# Patient Record
Sex: MALE | Race: WHITE | NOT HISPANIC OR LATINO | Employment: STUDENT | URBAN - METROPOLITAN AREA
[De-identification: names, ages, dates, MRNs, and addresses within clinical notes are randomized per-mention and may not be internally consistent; named-entity substitution may affect disease eponyms.]

---

## 2023-09-27 LAB
ALANINE AMINOTRANSFERASE (SGPT) (U/L) IN SER/PLAS: 19 U/L (ref 10–52)
ALBUMIN (G/DL) IN SER/PLAS: 4.5 G/DL (ref 3.4–5)
ALKALINE PHOSPHATASE (U/L) IN SER/PLAS: 74 U/L (ref 33–120)
ANION GAP IN SER/PLAS: 35 MMOL/L (ref 10–20)
APPEARANCE, URINE: CLEAR
ASPARTATE AMINOTRANSFERASE (SGOT) (U/L) IN SER/PLAS: 17 U/L (ref 9–39)
BILIRUBIN TOTAL (MG/DL) IN SER/PLAS: 1.9 MG/DL (ref 0–1.2)
BILIRUBIN, URINE: NEGATIVE
BLOOD, URINE: NEGATIVE
CALCIUM (MG/DL) IN SER/PLAS: 9.5 MG/DL (ref 8.6–10.6)
CARBON DIOXIDE, TOTAL (MMOL/L) IN SER/PLAS: 6 MMOL/L (ref 21–32)
CHLORIDE (MMOL/L) IN SER/PLAS: 101 MMOL/L (ref 98–107)
COLOR, URINE: ABNORMAL
CREATININE (MG/DL) IN SER/PLAS: 1.01 MG/DL (ref 0.5–1.3)
ERYTHROCYTE DISTRIBUTION WIDTH (RATIO) BY AUTOMATED COUNT: 12.2 % (ref 11.5–14.5)
ERYTHROCYTE MEAN CORPUSCULAR HEMOGLOBIN CONCENTRATION (G/DL) BY AUTOMATED: 34.1 G/DL (ref 32–36)
ERYTHROCYTE MEAN CORPUSCULAR VOLUME (FL) BY AUTOMATED COUNT: 87 FL (ref 80–100)
ERYTHROCYTES (10*6/UL) IN BLOOD BY AUTOMATED COUNT: 5.11 X10E12/L (ref 4.5–5.9)
GFR MALE: >90 ML/MIN/1.73M2
GLUCOSE (MG/DL) IN SER/PLAS: 419 MG/DL (ref 74–99)
GLUCOSE, URINE: ABNORMAL MG/DL
HEMATOCRIT (%) IN BLOOD BY AUTOMATED COUNT: 44.6 % (ref 41–52)
HEMOGLOBIN (G/DL) IN BLOOD: 15.2 G/DL (ref 13.5–17.5)
KETONES, URINE: ABNORMAL MG/DL
LEUKOCYTE ESTERASE, URINE: NEGATIVE
LEUKOCYTES (10*3/UL) IN BLOOD BY AUTOMATED COUNT: 20.5 X10E9/L (ref 4.4–11.3)
LIPASE (U/L) IN SER/PLAS: 7 U/L (ref 9–82)
NITRITE, URINE: NEGATIVE
NRBC (PER 100 WBCS) BY AUTOMATED COUNT: 0 /100 WBC (ref 0–0)
PATIENT TEMPERATURE: 37 DEGREES C
PH, URINE: 5 (ref 5–8)
PLATELETS (10*3/UL) IN BLOOD AUTOMATED COUNT: 351 X10E9/L (ref 150–450)
POCT ANION GAP, VENOUS: 28 MMOL/L (ref 10–25)
POCT ANION GAP, VENOUS: 30 MMOL/L (ref 10–25)
POCT ANION GAP, VENOUS: ABNORMAL MMOL/L (ref 10–25)
POCT BASE EXCESS, VENOUS: -18.2 MMOL/L (ref -2–3)
POCT BASE EXCESS, VENOUS: -19.9 MMOL/L (ref -2–3)
POCT BASE EXCESS, VENOUS: ABNORMAL MMOL/L (ref -2–3)
POCT CHLORIDE, VENOUS: 100 MMOL/L (ref 98–107)
POCT CHLORIDE, VENOUS: 102 MMOL/L (ref 98–107)
POCT CHLORIDE, VENOUS: ABNORMAL MMOL/L (ref 98–107)
POCT GLUCOSE, VENOUS: 440 MG/DL (ref 74–99)
POCT GLUCOSE, VENOUS: 477 MG/DL (ref 74–99)
POCT GLUCOSE, VENOUS: 532 MG/DL (ref 74–99)
POCT GLUCOSE: 365 MG/DL (ref 74–99)
POCT GLUCOSE: 385 MG/DL (ref 74–99)
POCT HCO3, VENOUS: 7.8 MMOL/L (ref 22–26)
POCT HCO3, VENOUS: 8.2 MMOL/L (ref 22–26)
POCT HCO3, VENOUS: ABNORMAL MMOL/L (ref 22–26)
POCT HEMATOCRIT, VENOUS: 45 % (ref 41–52)
POCT HEMATOCRIT, VENOUS: 46 % (ref 41–52)
POCT HEMATOCRIT, VENOUS: 47 % (ref 41–52)
POCT HEMOGLOBIN, VENOUS: 15 G/DL (ref 13.5–17.5)
POCT HEMOGLOBIN, VENOUS: 15.2 G/DL (ref 13.5–17.5)
POCT HEMOGLOBIN, VENOUS: 15.5 G/DL (ref 13.5–17.5)
POCT IONIZED CALCIUM, VENOUS: 1.16 MMOL/L (ref 1.1–1.33)
POCT IONIZED CALCIUM, VENOUS: 1.21 MMOL/L (ref 1.1–1.33)
POCT IONIZED CALCIUM, VENOUS: ABNORMAL MMOL/L (ref 1.1–1.33)
POCT LACTATE, VENOUS: 3.8 MMOL/L (ref 0.4–2)
POCT LACTATE, VENOUS: 4.9 MMOL/L (ref 0.4–2)
POCT LACTATE, VENOUS: 5.3 MMOL/L (ref 0.4–2)
POCT OXY HEMOGLOBIN, VENOUS: 68.7 % (ref 45–75)
POCT OXY HEMOGLOBIN, VENOUS: 85.3 % (ref 45–75)
POCT OXY HEMOGLOBIN, VENOUS: 91 % (ref 45–75)
POCT PCO2, VENOUS: 22 MMHG (ref 41–51)
POCT PCO2, VENOUS: 24 MMHG (ref 41–51)
POCT PCO2, VENOUS: ABNORMAL MMHG (ref 41–51)
POCT PH, VENOUS: 7.12 (ref 7.33–7.43)
POCT PH, VENOUS: 7.18 (ref 7.33–7.43)
POCT PH, VENOUS: ABNORMAL (ref 7.33–7.43)
POCT PO2, VENOUS: 50 MMHG (ref 35–45)
POCT PO2, VENOUS: 57 MMHG (ref 35–45)
POCT PO2, VENOUS: 76 MMHG (ref 35–45)
POCT POTASSIUM, VENOUS: 6 MMOL/L (ref 3.5–5.3)
POCT POTASSIUM, VENOUS: 6.4 MMOL/L (ref 3.5–5.3)
POCT POTASSIUM, VENOUS: 6.7 MMOL/L (ref 3.5–5.3)
POCT SO2, VENOUS: 71 % (ref 45–75)
POCT SO2, VENOUS: 88 % (ref 45–75)
POCT SO2, VENOUS: 94 % (ref 45–75)
POCT SODIUM, VENOUS: 131 MMOL/L (ref 136–145)
POCT SODIUM, VENOUS: 132 MMOL/L (ref 136–145)
POCT SODIUM, VENOUS: ABNORMAL MMOL/L (ref 136–145)
POTASSIUM (MMOL/L) IN SER/PLAS: 6.1 MMOL/L (ref 3.5–5.3)
PROTEIN TOTAL: 6.7 G/DL (ref 6.4–8.2)
PROTEIN, URINE: NEGATIVE MG/DL
SODIUM (MMOL/L) IN SER/PLAS: 136 MMOL/L (ref 136–145)
SPECIFIC GRAVITY, URINE: 1.02 (ref 1–1.03)
UREA NITROGEN (MG/DL) IN SER/PLAS: 18 MG/DL (ref 6–23)
UROBILINOGEN, URINE: <2 MG/DL (ref 0–1.9)

## 2023-09-28 ENCOUNTER — HOSPITAL ENCOUNTER (INPATIENT)
Dept: DATA CONVERSION | Facility: HOSPITAL | Age: 23
LOS: 1 days | Discharge: HOME | End: 2023-09-28
Attending: EMERGENCY MEDICINE | Admitting: EMERGENCY MEDICINE
Payer: COMMERCIAL

## 2023-09-28 VITALS — BODY MASS INDEX: 26.68 KG/M2 | HEIGHT: 73 IN | WEIGHT: 201.28 LBS

## 2023-09-28 DIAGNOSIS — E11.10 TYPE 2 DIABETES MELLITUS WITH KETOACIDOSIS WITHOUT COMA (MULTI): ICD-10-CM

## 2023-09-28 LAB
ALBUMIN (G/DL) IN SER/PLAS: 3.5 G/DL (ref 3.4–5)
ALBUMIN (G/DL) IN SER/PLAS: 4 G/DL (ref 3.4–5)
AMPHETAMINE (PRESENCE) IN URINE BY SCREEN METHOD: ABNORMAL
ANION GAP IN SER/PLAS: 12 MMOL/L (ref 10–20)
ANION GAP IN SER/PLAS: 22 MMOL/L (ref 10–20)
BARBITURATES PRESENCE IN URINE BY SCREEN METHOD: ABNORMAL
BASOPHILS (10*3/UL) IN BLOOD BY AUTOMATED COUNT: 0.05 X10E9/L (ref 0–0.1)
BASOPHILS/100 LEUKOCYTES IN BLOOD BY AUTOMATED COUNT: 0.2 % (ref 0–2)
BENZODIAZEPINE (PRESENCE) IN URINE BY SCREEN METHOD: ABNORMAL
BETA HYDROXYBUTYRATE (MMOL/L) IN SER/PLAS: 6.91 MMOL/L (ref 0.02–0.27)
CALCIUM (MG/DL) IN SER/PLAS: 8.7 MG/DL (ref 8.6–10.6)
CALCIUM (MG/DL) IN SER/PLAS: 8.8 MG/DL (ref 8.6–10.6)
CANNABINOIDS IN URINE BY SCREEN METHOD: ABNORMAL
CARBON DIOXIDE, TOTAL (MMOL/L) IN SER/PLAS: 14 MMOL/L (ref 21–32)
CARBON DIOXIDE, TOTAL (MMOL/L) IN SER/PLAS: 20 MMOL/L (ref 21–32)
CHLORIDE (MMOL/L) IN SER/PLAS: 107 MMOL/L (ref 98–107)
CHLORIDE (MMOL/L) IN SER/PLAS: 110 MMOL/L (ref 98–107)
COCAINE (PRESENCE) IN URINE BY SCREEN METHOD: ABNORMAL
CREATININE (MG/DL) IN SER/PLAS: 0.95 MG/DL (ref 0.5–1.3)
CREATININE (MG/DL) IN SER/PLAS: 1.06 MG/DL (ref 0.5–1.3)
DRUG SCREEN COMMENT URINE: ABNORMAL
EOSINOPHILS (10*3/UL) IN BLOOD BY AUTOMATED COUNT: 0.01 X10E9/L (ref 0–0.7)
EOSINOPHILS/100 LEUKOCYTES IN BLOOD BY AUTOMATED COUNT: 0 % (ref 0–6)
ERYTHROCYTE DISTRIBUTION WIDTH (RATIO) BY AUTOMATED COUNT: 12.2 % (ref 11.5–14.5)
ERYTHROCYTE MEAN CORPUSCULAR HEMOGLOBIN CONCENTRATION (G/DL) BY AUTOMATED: 34.3 G/DL (ref 32–36)
ERYTHROCYTE MEAN CORPUSCULAR VOLUME (FL) BY AUTOMATED COUNT: 90 FL (ref 80–100)
ERYTHROCYTES (10*6/UL) IN BLOOD BY AUTOMATED COUNT: 4.06 X10E12/L (ref 4.5–5.9)
ESTIMATED AVERAGE GLUCOSE FOR HBA1C: 200 MG/DL
ESTIMATED AVERAGE GLUCOSE FOR HBA1C: NORMAL
FENTANYL URINE: ABNORMAL
GFR MALE: >90 ML/MIN/1.73M2
GFR MALE: >90 ML/MIN/1.73M2
GLUCOSE (MG/DL) IN SER/PLAS: 161 MG/DL (ref 74–99)
GLUCOSE (MG/DL) IN SER/PLAS: 211 MG/DL (ref 74–99)
HEMATOCRIT (%) IN BLOOD BY AUTOMATED COUNT: 36.7 % (ref 41–52)
HEMOGLOBIN (G/DL) IN BLOOD: 12.6 G/DL (ref 13.5–17.5)
HEMOGLOBIN A1C/HEMOGLOBIN TOTAL IN BLOOD: 8.6 %
HEMOGLOBIN A1C/HEMOGLOBIN TOTAL IN BLOOD: NORMAL
HGB A1C-DATA CONVERSION: NORMAL %
IMMATURE GRANULOCYTES/100 LEUKOCYTES IN BLOOD BY AUTOMATED COUNT: 0.7 % (ref 0–0.9)
LEUKOCYTES (10*3/UL) IN BLOOD BY AUTOMATED COUNT: 20.5 X10E9/L (ref 4.4–11.3)
LYMPHOCYTES (10*3/UL) IN BLOOD BY AUTOMATED COUNT: 2.95 X10E9/L (ref 1.2–4.8)
LYMPHOCYTES/100 LEUKOCYTES IN BLOOD BY AUTOMATED COUNT: 14.4 % (ref 13–44)
MAGNESIUM (MG/DL) IN SER/PLAS: 1.53 MG/DL (ref 1.6–2.4)
MAGNESIUM (MG/DL) IN SER/PLAS: 1.66 MG/DL (ref 1.6–2.4)
MAGNESIUM (MG/DL) IN SER/PLAS: 1.66 MG/DL (ref 1.6–2.4)
MAGNESIUM (MG/DL) IN SER/PLAS: 1.77 MG/DL (ref 1.6–2.4)
MONOCYTES (10*3/UL) IN BLOOD BY AUTOMATED COUNT: 1.34 X10E9/L (ref 0.1–1)
MONOCYTES/100 LEUKOCYTES IN BLOOD BY AUTOMATED COUNT: 6.6 % (ref 2–10)
NEUTROPHILS (10*3/UL) IN BLOOD BY AUTOMATED COUNT: 15.95 X10E9/L (ref 1.2–7.7)
NEUTROPHILS/100 LEUKOCYTES IN BLOOD BY AUTOMATED COUNT: 78.1 % (ref 40–80)
NRBC (PER 100 WBCS) BY AUTOMATED COUNT: 0 /100 WBC (ref 0–0)
OPIATES (PRESENCE) IN URINE BY SCREEN METHOD: ABNORMAL
OXYCODONE (PRESENCE) IN URINE BY SCREEN METHOD: ABNORMAL
PATIENT TEMPERATURE: 37 DEGREES C
PATIENT TEMPERATURE: 37 DEGREES C
PHENCYCLIDINE (PRESENCE) IN URINE BY SCREEN METHOD: ABNORMAL
PHOSPHATE (MG/DL) IN SER/PLAS: 3.5 MG/DL (ref 2.5–4.9)
PHOSPHATE (MG/DL) IN SER/PLAS: 3.5 MG/DL (ref 2.5–4.9)
PHOSPHATE (MG/DL) IN SER/PLAS: 3.8 MG/DL (ref 2.5–4.9)
PHOSPHATE (MG/DL) IN SER/PLAS: 4.4 MG/DL (ref 2.5–4.9)
PLATELETS (10*3/UL) IN BLOOD AUTOMATED COUNT: 284 X10E9/L (ref 150–450)
POCT ANION GAP, VENOUS: 20 MMOL/L (ref 10–25)
POCT ANION GAP, VENOUS: 26 MMOL/L (ref 10–25)
POCT BASE EXCESS, VENOUS: -13.7 MMOL/L (ref -2–3)
POCT BASE EXCESS, VENOUS: -18.3 MMOL/L (ref -2–3)
POCT CHLORIDE, VENOUS: 104 MMOL/L (ref 98–107)
POCT CHLORIDE, VENOUS: 105 MMOL/L (ref 98–107)
POCT GLUCOSE, VENOUS: 280 MG/DL (ref 74–99)
POCT GLUCOSE, VENOUS: 405 MG/DL (ref 74–99)
POCT GLUCOSE: 132 MG/DL (ref 74–99)
POCT GLUCOSE: 162 MG/DL (ref 74–99)
POCT GLUCOSE: 162 MG/DL (ref 74–99)
POCT GLUCOSE: 167 MG/DL (ref 74–99)
POCT GLUCOSE: 188 MG/DL (ref 74–99)
POCT GLUCOSE: 227 MG/DL (ref 74–99)
POCT GLUCOSE: 238 MG/DL (ref 74–99)
POCT GLUCOSE: 242 MG/DL (ref 74–99)
POCT GLUCOSE: 264 MG/DL (ref 74–99)
POCT GLUCOSE: 99 MG/DL (ref 74–99)
POCT HCO3, VENOUS: 13.3 MMOL/L (ref 22–26)
POCT HCO3, VENOUS: 9.8 MMOL/L (ref 22–26)
POCT HEMATOCRIT, VENOUS: 42 % (ref 41–52)
POCT HEMATOCRIT, VENOUS: 44 % (ref 41–52)
POCT HEMOGLOBIN, VENOUS: 13.9 G/DL (ref 13.5–17.5)
POCT HEMOGLOBIN, VENOUS: 14.8 G/DL (ref 13.5–17.5)
POCT IONIZED CALCIUM, VENOUS: 1.24 MMOL/L (ref 1.1–1.33)
POCT IONIZED CALCIUM, VENOUS: 1.25 MMOL/L (ref 1.1–1.33)
POCT LACTATE, VENOUS: 1.5 MMOL/L (ref 0.4–2)
POCT LACTATE, VENOUS: 2.9 MMOL/L (ref 0.4–2)
POCT LACTATE, VENOUS: NORMAL
POCT OXY HEMOGLOBIN, VENOUS: 80.4 % (ref 45–75)
POCT OXY HEMOGLOBIN, VENOUS: 88.4 % (ref 45–75)
POCT PCO2, VENOUS: 30 MMHG (ref 41–51)
POCT PCO2, VENOUS: 34 MMHG (ref 41–51)
POCT PH, VENOUS: 7.12 (ref 7.33–7.43)
POCT PH, VENOUS: 7.2 (ref 7.33–7.43)
POCT PO2, VENOUS: 58 MMHG (ref 35–45)
POCT PO2, VENOUS: 65 MMHG (ref 35–45)
POCT POTASSIUM, VENOUS: 4.9 MMOL/L (ref 3.5–5.3)
POCT POTASSIUM, VENOUS: 5.8 MMOL/L (ref 3.5–5.3)
POCT SO2, VENOUS: 83 % (ref 45–75)
POCT SO2, VENOUS: 91 % (ref 45–75)
POCT SODIUM, VENOUS: 133 MMOL/L (ref 136–145)
POCT SODIUM, VENOUS: 134 MMOL/L (ref 136–145)
POTASSIUM (MMOL/L) IN SER/PLAS: 4.4 MMOL/L (ref 3.5–5.3)
POTASSIUM (MMOL/L) IN SER/PLAS: 4.6 MMOL/L (ref 3.5–5.3)
SARS-COV-2 RESULT: NOT DETECTED
SODIUM (MMOL/L) IN SER/PLAS: 138 MMOL/L (ref 136–145)
SODIUM (MMOL/L) IN SER/PLAS: 138 MMOL/L (ref 136–145)
UREA NITROGEN (MG/DL) IN SER/PLAS: 16 MG/DL (ref 6–23)
UREA NITROGEN (MG/DL) IN SER/PLAS: 19 MG/DL (ref 6–23)

## 2023-09-30 NOTE — H&P
Service:   Critical Care Service:  ·  Service MICU      History of Present Illness:   HPI:    Mr. Maharaj is a 24yo PMH T1D with Dexcom and insulin pump,who presented to the ED for nausea, vomiting, abdominal  pain since this AM. States that he has had DKA two other times in the past, when he was first diagnosed with T1D and in  i/s/o a night of heavy drinking. He states that last night he had 8 alcoholic drinks and thinks this is what caused his DKA.  Normally has HA after drinking, but not typically GI sx. States that abdominal pain is epigastric and better with leaning forward. Stopped insulin pump this AM when he developed N/V. Thinks that his insulin pump might be malfunctioning since his CBGs  on that were lower than the readings the EMS got when they picked him up    At bedside, pt states that his nausea and abdominal pain are resolved. Feels ready to eat.     States that he recently was travelling [place] and has had a cough for 1.5weeks with yellow phlegm but that it is improving. Denes CP, SOB, dysuria, diarrhea, medication changes.     ED Course:   Vitals  T 36.5, , /69, RR 20, SpO2 98% on RA   Labs:  CBC: 20.5 <15.2 /44.6  <351  CMP: 136/6.1/101/6 /18/1.01<419  Anion gap: 29  BHB: 6.91  LFT: Alk phos 74, AST 17, ALT 19, Tbili 1.9  Lipase 7   UA: 2+ ketones and 3+ glucose      VB pH 7.18, pCO2 22, HCO3 8.2, lactate 3.8  2317 pH 7.12, pCO2 24, HCO3 7.8, lactate 4.9  0025 pH 7.12, pCO2 30, HCO3 9.8, lactate 2.9     POC glucose 365 > 385     Interventions: toradol 15mg IVP, morphine 4mg IVP,  Zofran 4mg IVP x2, 1L NS x2, insulin gtt 9U/hr    PMH:   As mentioned above    PSH:  None     Social Hx:  - EtOH: Yes (1-2 beers/week), 4-5 drinks on special occasions   - Tobacco: No   - Illicit Drugs: marijuana 2x/week (smokes and edibles)  - Lives [place]: his room[place]; 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  Lunch - 1 units per 6 grams of carbohydrates at Lunch.  Dinner - 1 units per 6 grams of carbohydrates at Dinner.  Bedtime Snack - 1 units per 6 grams of carbohydrates at Bedtime Snack.  Insulin Sensitivity Factor:  Time: 6 AM, 1 Unit Per: 30 mg/dl, Target: 120 mg/dl.  Time: 9 PM, 1 Unit Per: 30 mg/dl, Target: 150 mg/d    Allergies:  None       Comorbidities:   Comorbidites:  ·  Comorbid Conditions diabetes   ·  Diabetes Type Type 1   ·  Insulin Dependent yes   ·  DM Acuity or Status unknown     Social History:   Social History:  Smoking Status never smoker (1)   Alcohol Use occasionally(1)   Drug Use weekly (1)   Drug 2 Use denies (1)              Allergies:  ·  No Known Allergies :     Medications Prior to Admission:   Admission Medication Reconciliation has not been completed for this patient.    Objective:   Objective Information:    ·  Objective Information      T   P  R  BP   MAP  SpO2   Value  36.6  116  10  118/73   84  97%  Date/Time 9/28 4:00 9/28 3:00 9/28 3:00 9/28 3:00  9/28 3:00 9/28 3:00  Range  (36.5C - 37.2C )  (108 - 116 )  (10 - 20 )  (104 - 127 )/ (48 - 73 )  (69 - 84 )  (95% - 98% )  Highest temp of 37.2 C was recorded at 9/28 2:26      Pain reported at 9/28 2:26: 0 = None        Date:            Weight/Scale Type:  28-Sep-2023 02:35  91.3  kg / bed  27-Sep-2023 20:27  90.9  kg           Physical Exam Narrative:  ·  Physical Exam:    General: well-developed M lying in bed in NAD  CV: tachycardic, no m/r/g  Pulm: CTAB, no increased WOB  GI: Mild ttp in the upper abdomen, no guarding or rigidity  Extremities: no edema     Recent Lab Results:    Results:    CBC: 9/27/2023 20:44              \     Hgb     /                              \     15.2       /  WBC   ----------------  Plt               20.5 HH    ----------------    351              /     Hct     \                              /     44.6       \            RBC: 5.11     MCV: 87           CMP: 9/27/2023 20:44  NA+        Cl-     BUN  /                         136    101    18  /  --------------------------------  Glucose                ---------------------------  419 H    K+     HCO3-   Creat \                         6.1 HH   6 LL    1.01  \           \  T Bili  /                    \  1.9 H /  AST  x ---- x ALT        17 x ---- x 19         /  Alk P   \               /  74  \  Calcium : 9.5     Anion Gap : 35 H    Albumin : 4.5     T Protein : 6.7           RFP: 9/28/2023 03:00  NA+        Cl-     BUN  /                         138    107    19  /  --------------------------------  Glucose                ---------------------------  211 H    K+     HCO3-   Creat \                         4.6    14 L   1.06  \  Calcium : 8.8Anion Gap : 22 H         Albumin : 4.0     Phos : 3.8          ---------- Recent Arterial Blood Gas Results----------     nullnullnullnullnullnullnull    Assessment and Plan:   Neurology:  Diagnosis:    Mr. Maharaj is a 22yo PMH T1D with Dexcom and insulin pump, DKA 12/21 i/s/o heavy drinking who presented to the ED for nausea,  vomiting, abdominal pain admitted to MICU for DKA. Likely due to heavy drinking last night (reports that he had 8 drinks) vs pancreatitis given epigastric pain improved with leaning forward. Lipase  WNL, will order CT to r/o pancreatitis. Continue insulin gtt. Pt reports improvement in nausea and abdominal pain    Neuro  No active issues    Cardio  No active issues     Pulm  No active issues      GI  #Abdominal pain, likely 2/2 DKA vs pancreatitis ( patient reports that pain and vomiting started first and it was after a couple of those episodes that he turned off his insulin pump  :: lipase WNL  - CT A/P with contrast ordered     Renal   No active  issues    Endo  #Diabetic Ketoacidosis   #Hyperkalemia   :: initial labs: B, anion gap 29; BHB 6.9; UA 2+ ketones 3+ glucose, K 6.1   -  Started on DKA protocol - c/w insulin drip (decreased to 4U/hr for BG < 200)  -  Lantus 20U  -  s/p 2L NS in ED  -  will give additional 1L LR, D5 1/2NS @ 250cc/h   -  Q4 RFP until anion gap closes   -  HgbA1C, Utox     MSK  No active issues     Heme/Onc           -      no active issues   ID  #Leukocytosis i/s/o DKA          -     COVID ordered    Oxygen: None   Feeding/fluids: 1L LR + D5 1/2NS at 250cc/h  Sedation: None   Thromboprophylaxis: None   Ulcer prophylaxis: None   Glycemic control: insulin drip   Bowel care: None   Indwelling catheter: None   Lines: PIVs     Code Status: Full   NOK: Ling Santillan [phone]       Code Status:  ·  Code Status Full Code     Attestation:   Note Completion:  I am a:  Resident/Fellow   Attending Attestation I saw and evaluated the patient.  I personally obtained the key and critical portions of the history and physical exam or was physically present for key and  critical portions performed by the resident/fellow. I reviewed the resident/fellow?s documentation and discussed the patient with the resident/fellow.  I agree with the resident/fellow?s medical decision making as documented in the note.     I personally evaluated the patient on 28-Sep-2023   Comments/ Additional Findings    DKA needing continuous insulin gtt. I have altered the note to reflect my findings.  Remaining as detailed above     Critical Care Patient I have reviewed and evaluated the most recent data and results, personally examined the patient, and formulated the plan of care as presented above.  This patient  was critically ill and required continued critical care treatment. Teaching and any separately billable procedures are not included in the time calculation.    Billing Provider Critical Care Time 37 minute(s)   Primary Critical Care Issue/Treatment (See Assessment  "and Plan for greater detail) -- For the nature of the critical condition and treatment, this documentation has been prepared by the attending physician/ARMIN- billing provider of these critical  care services.         Electronic Signatures:  Marissa Thayer)  (Signed 28-Sep-2023 06:02)   Authored: Service, History of Present Illness, Objective,  Assessment and Plan, Note Completion   Co-Signer: History of Present Illness, Comorbidities, Social History, Allergies, Medications Prior to Admission, Objective, Assessment  and Plan, Note Completion  Linn Hernandez (Resident))  (Signed 28-Sep-2023 05:30)   Authored: History of Present Illness, Comorbidities,  Social History, Allergies, Medications Prior to Admission, Objective, Assessment and Plan, Note Completion      Last Updated: 28-Sep-2023 06:02 by Marissa Thayer)    References:  1.  Data Referenced From \"Patient Profile - Adult v2\" 28-Sep-2023 02:35   "

## 2023-10-02 LAB
ATRIAL RATE: 117 BPM
P AXIS: 79 DEGREES
P OFFSET: 202 MS
P ONSET: 157 MS
PR INTERVAL: 124 MS
Q ONSET: 219 MS
QRS COUNT: 19 BEATS
QRS DURATION: 76 MS
QT INTERVAL: 316 MS
QTC CALCULATION(BAZETT): 440 MS
QTC FREDERICIA: 395 MS
R AXIS: 80 DEGREES
T AXIS: 38 DEGREES
T OFFSET: 377 MS
VENTRICULAR RATE: 117 BPM

## 2023-10-12 PROBLEM — Z96.41 INSULIN PUMP STATUS: Status: ACTIVE | Noted: 2023-10-12

## 2023-10-12 PROBLEM — E10.9 DIABETES MELLITUS TYPE 1 (MULTI): Status: ACTIVE | Noted: 2023-10-12

## 2023-10-12 RX ORDER — INSULIN ASPART 100 [IU]/ML
1 INJECTION, SOLUTION INTRAVENOUS; SUBCUTANEOUS SEE ADMIN INSTRUCTIONS
COMMUNITY
End: 2023-10-13 | Stop reason: ENTERED-IN-ERROR

## 2023-10-12 RX ORDER — LANCETS
1 EACH MISCELLANEOUS
COMMUNITY

## 2023-10-12 RX ORDER — BLOOD SUGAR DIAGNOSTIC
1 STRIP MISCELLANEOUS AS NEEDED
COMMUNITY
Start: 2016-12-31

## 2023-10-12 RX ORDER — PEN NEEDLE, DIABETIC 30 GX3/16"
1 NEEDLE, DISPOSABLE MISCELLANEOUS SEE ADMIN INSTRUCTIONS
COMMUNITY
End: 2023-10-13 | Stop reason: WASHOUT

## 2023-10-12 RX ORDER — ISOPROPYL ALCOHOL 70 ML/100ML
SWAB TOPICAL
COMMUNITY

## 2023-10-12 RX ORDER — INSULIN LISPRO 100 [IU]/ML
5-12 INJECTION, SOLUTION INTRAVENOUS; SUBCUTANEOUS 3 TIMES DAILY
COMMUNITY
Start: 2014-11-02 | End: 2023-10-13 | Stop reason: ALTCHOICE

## 2023-10-12 RX ORDER — BLOOD-GLUCOSE METER
1 EACH MISCELLANEOUS SEE ADMIN INSTRUCTIONS
COMMUNITY
Start: 2016-06-16

## 2023-10-12 RX ORDER — INSULIN LISPRO 100 [IU]/ML
60 INJECTION, SOLUTION INTRAVENOUS; SUBCUTANEOUS DAILY
COMMUNITY
Start: 2022-10-01 | End: 2023-10-13 | Stop reason: SDUPTHER

## 2023-10-12 RX ORDER — INSULIN GLARGINE 100 [IU]/ML
25 INJECTION, SOLUTION SUBCUTANEOUS
COMMUNITY
Start: 2018-02-28 | End: 2023-10-13 | Stop reason: WASHOUT

## 2023-10-12 RX ORDER — IBUPROFEN 200 MG
4 TABLET ORAL
COMMUNITY
Start: 2014-11-02

## 2023-10-12 RX ORDER — DEXTROAMPHETAMINE SACCHARATE, AMPHETAMINE ASPARTATE, DEXTROAMPHETAMINE SULFATE AND AMPHETAMINE SULFATE 5; 5; 5; 5 MG/1; MG/1; MG/1; MG/1
20 TABLET ORAL
COMMUNITY
End: 2023-10-13

## 2023-10-12 RX ORDER — PEN NEEDLE, DIABETIC 30 GX3/16"
1 NEEDLE, DISPOSABLE MISCELLANEOUS
COMMUNITY
End: 2023-10-13 | Stop reason: WASHOUT

## 2023-10-13 ENCOUNTER — OFFICE VISIT (OUTPATIENT)
Dept: ENDOCRINOLOGY | Facility: CLINIC | Age: 23
End: 2023-10-13
Payer: COMMERCIAL

## 2023-10-13 VITALS
BODY MASS INDEX: 26.71 KG/M2 | DIASTOLIC BLOOD PRESSURE: 77 MMHG | HEART RATE: 83 BPM | WEIGHT: 202.4 LBS | SYSTOLIC BLOOD PRESSURE: 118 MMHG

## 2023-10-13 DIAGNOSIS — E10.9 TYPE 1 DIABETES MELLITUS WITHOUT COMPLICATION (MULTI): Primary | ICD-10-CM

## 2023-10-13 PROCEDURE — 99214 OFFICE O/P EST MOD 30 MIN: CPT | Performed by: INTERNAL MEDICINE

## 2023-10-13 PROCEDURE — 3052F HG A1C>EQUAL 8.0%<EQUAL 9.0%: CPT | Performed by: INTERNAL MEDICINE

## 2023-10-13 PROCEDURE — 1036F TOBACCO NON-USER: CPT | Performed by: INTERNAL MEDICINE

## 2023-10-13 PROCEDURE — 3074F SYST BP LT 130 MM HG: CPT | Performed by: INTERNAL MEDICINE

## 2023-10-13 PROCEDURE — 3078F DIAST BP <80 MM HG: CPT | Performed by: INTERNAL MEDICINE

## 2023-10-13 RX ORDER — INSULIN GLARGINE 100 [IU]/ML
20 INJECTION, SOLUTION SUBCUTANEOUS EVERY 24 HOURS
Qty: 10 ML | Refills: 3 | Status: SHIPPED | OUTPATIENT
Start: 2023-10-13 | End: 2023-11-12

## 2023-10-13 RX ORDER — INSULIN ASPART 100 [IU]/ML
INJECTION, SOLUTION INTRAVENOUS; SUBCUTANEOUS
Qty: 60 ML | Refills: 3 | Status: SHIPPED | OUTPATIENT
Start: 2023-10-13 | End: 2024-03-19 | Stop reason: CLARIF

## 2023-10-13 RX ORDER — INSULIN LISPRO 100 [IU]/ML
60 INJECTION, SOLUTION INTRAVENOUS; SUBCUTANEOUS DAILY
Qty: 60 ML | Refills: 3 | Status: SHIPPED | OUTPATIENT
Start: 2023-10-13 | End: 2024-03-19 | Stop reason: SDUPTHER

## 2023-10-13 ASSESSMENT — ENCOUNTER SYMPTOMS
SHORTNESS OF BREATH: 0
DIARRHEA: 0
UNEXPECTED WEIGHT CHANGE: 0
NAUSEA: 0
VOMITING: 0
CONSTIPATION: 0

## 2023-10-13 NOTE — PATIENT INSTRUCTIONS
RECOMMENDATIONS  Continue current program    Follow up 3 months  A1c at next appointment if not already done    Eye exam annually

## 2023-10-13 NOTE — PROGRESS NOTES
History Of Present Illness  Akira Maharaj is a 23 y.o. male     Duration of type 1 diabetes mellitus:  9 years  Complications:  none    Patient admitted to [facility] 2 weeks ago for DKA, attributed to binge drinking  Patient denies insulin pump failure.  Pump had been in manual mode, not suspended.    Insulin pump status  OmniPod G5  Insulin:  Humalog    Changes Pod every 72h    Automated mode: 49 % (recent hospital admission)  Automated basal:  22.2 units/day      Manual Basal  Basal total:  19.2 units/day  MN  0.8 unit/h    Bolus  MN 1 unit per 5 grams carb  08  1 unit per 6 grams carb    Insulin sensitivity factor:  30  Target glucose 120  Active insulin time 4h    DexCom G6  Patient is testing glucose 288 times daily  Records reviewed, on file    Last eye exam:  2022    Past Medical History  He has a past medical history of Acute pharyngitis, unspecified (03/08/2016), Acute upper respiratory infection, unspecified (04/13/2020), Acute upper respiratory infection, unspecified (03/08/2016), Acute upper respiratory infection, unspecified (09/11/2014), Contusion of left thigh, initial encounter (02/18/2016), Other conditions influencing health status, Other obesity due to excess calories (04/02/2015), Personal history of other diseases of male genital organs (05/06/2016), Personal history of other diseases of the respiratory system (03/08/2016), Personal history of other diseases of the respiratory system (09/11/2014), Personal history of other diseases of the respiratory system (10/01/2014), Personal history of other diseases of the respiratory system (09/11/2014), Personal history of other diseases of the respiratory system (03/17/2014), Personal history of other infectious and parasitic diseases (04/13/2020), Personal history of other specified conditions (10/01/2014), Personal history of other specified conditions (04/02/2015), Personal history of other specified conditions (10/01/2014), and Personal history of  pneumonia (recurrent) (05/28/2013).    Surgical History  He has no past surgical history on file.     Social History  He has no history on file for tobacco use, alcohol use, and drug use.    Family History  Family History   Problem Relation Name Age of Onset    Other (congenital  hypothyroidism) Mother      Thyroid cancer Mother      Hyperlipidemia Father      Hypertension Father      Thyroid cancer Other grandmother        Allergies  Cefaclor    Review of Systems   Constitutional:  Negative for unexpected weight change.   Eyes:  Negative for visual disturbance.   Respiratory:  Negative for shortness of breath.    Cardiovascular:  Negative for chest pain.   Gastrointestinal:  Negative for constipation, diarrhea, nausea and vomiting.         Last Recorded Vitals  Blood pressure 118/77, pulse 83, weight 91.8 kg (202 lb 6.4 oz).    Physical Exam  Constitutional:       General: He is not in acute distress.  HENT:      Head: Normocephalic.   Eyes:      Extraocular Movements: Extraocular movements intact.   Neck:      Thyroid: No thyromegaly.   Cardiovascular:      Pulses:           Radial pulses are 2+ on the right side and 2+ on the left side.   Musculoskeletal:      Right lower leg: No edema.      Left lower leg: No edema.   Lymphadenopathy:      Cervical: No cervical adenopathy.   Neurological:      Mental Status: He is alert.      Motor: No tremor.   Psychiatric:         Mood and Affect: Affect normal.          Relevant Results  Glucose (mg/dL)   Date Value   09/28/2023 161 (H)   09/28/2023 211 (H)   09/27/2023 419 (H)     Hemoglobin A1C   Date Value   09/28/2023 8.6 % (A)   09/28/2023 CANCELED   12/12/2021 9.9 % (A)     Bicarbonate (mmol/L)   Date Value   09/28/2023 20 (L)   09/28/2023 14 (L)   09/27/2023 6 (LL)     Urea Nitrogen (mg/dL)   Date Value   09/28/2023 16   09/28/2023 19   09/27/2023 18     Creatinine (mg/dL)   Date Value   09/28/2023 0.95   09/28/2023 1.06   09/27/2023 1.01     Lab Results   Component  "Value Date    CHOL 205 (H) 01/14/2021    CHOL 150 01/04/2018     Lab Results   Component Value Date    HDL 60.1 01/14/2021    HDL 54.5 01/04/2018     No results found for: \"LDLCALC\"  Lab Results   Component Value Date    TRIG 71 01/14/2021    TRIG 54 01/04/2018     No components found for: \"CHOLHDL\"   Lab Results   Component Value Date    TSH 0.95 01/14/2021     No results found for: \"ALBUR\", \"EDH50KAA\"       IMPRESSION  TYPE 1 DIABETES MELLITUS  INSULIN PUMP STATUS  Recent DKA following binge alcohol intake      RECOMMENDATIONS  Continue current program    Follow up 3 months  A1c at next appointment if not already done    Eye exam annually  "

## 2024-01-25 ENCOUNTER — APPOINTMENT (OUTPATIENT)
Dept: ENDOCRINOLOGY | Facility: CLINIC | Age: 24
End: 2024-01-25
Payer: COMMERCIAL

## 2024-03-19 DIAGNOSIS — E10.9 TYPE 1 DIABETES MELLITUS WITHOUT COMPLICATION (MULTI): Primary | ICD-10-CM

## 2024-03-19 DIAGNOSIS — E10.9 TYPE 1 DIABETES MELLITUS WITHOUT COMPLICATION (MULTI): ICD-10-CM

## 2024-03-19 RX ORDER — INSULIN PMP CART,AUT,G6/7,CNTR
1 EACH SUBCUTANEOUS
Qty: 30 EACH | Refills: 3 | Status: SHIPPED | OUTPATIENT
Start: 2024-03-19 | End: 2025-03-19

## 2024-03-19 RX ORDER — INSULIN LISPRO 100 [IU]/ML
INJECTION, SOLUTION INTRAVENOUS; SUBCUTANEOUS
Qty: 60 ML | Refills: 3 | Status: SHIPPED | OUTPATIENT
Start: 2024-03-19

## 2024-11-12 ENCOUNTER — HOSPITAL ENCOUNTER (EMERGENCY)
Facility: HOSPITAL | Age: 24
Discharge: HOME | End: 2024-11-12
Attending: EMERGENCY MEDICINE
Payer: COMMERCIAL

## 2024-11-12 ENCOUNTER — APPOINTMENT (OUTPATIENT)
Dept: RADIOLOGY | Facility: HOSPITAL | Age: 24
End: 2024-11-12
Payer: COMMERCIAL

## 2024-11-12 VITALS
WEIGHT: 205.03 LBS | OXYGEN SATURATION: 98 % | RESPIRATION RATE: 16 BRPM | DIASTOLIC BLOOD PRESSURE: 60 MMHG | TEMPERATURE: 97.2 F | HEART RATE: 65 BPM | BODY MASS INDEX: 27.77 KG/M2 | HEIGHT: 72 IN | SYSTOLIC BLOOD PRESSURE: 128 MMHG

## 2024-11-12 DIAGNOSIS — M70.62 GREATER TROCHANTERIC BURSITIS OF LEFT HIP: ICD-10-CM

## 2024-11-12 DIAGNOSIS — M25.552 LEFT HIP PAIN: Primary | ICD-10-CM

## 2024-11-12 PROCEDURE — 99283 EMERGENCY DEPT VISIT LOW MDM: CPT | Mod: 25

## 2024-11-12 PROCEDURE — 73502 X-RAY EXAM HIP UNI 2-3 VIEWS: CPT | Mod: LEFT SIDE | Performed by: RADIOLOGY

## 2024-11-12 PROCEDURE — 73502 X-RAY EXAM HIP UNI 2-3 VIEWS: CPT | Mod: LT

## 2024-11-12 ASSESSMENT — ENCOUNTER SYMPTOMS
DIZZINESS: 0
DYSURIA: 0
SORE THROAT: 0
BACK PAIN: 0
HEADACHES: 0
JOINT SWELLING: 0
COUGH: 0
AGITATION: 0
PALPITATIONS: 0
SHORTNESS OF BREATH: 0
WOUND: 0
FEVER: 0
EYE DISCHARGE: 0
EYE PAIN: 0
CONFUSION: 0
RHINORRHEA: 0
VOMITING: 0
CHILLS: 0
NAUSEA: 0
ABDOMINAL PAIN: 0

## 2024-11-12 ASSESSMENT — PAIN - FUNCTIONAL ASSESSMENT: PAIN_FUNCTIONAL_ASSESSMENT: 0-10

## 2024-11-12 ASSESSMENT — PAIN DESCRIPTION - LOCATION: LOCATION: HIP

## 2024-11-12 ASSESSMENT — PAIN DESCRIPTION - ORIENTATION: ORIENTATION: LEFT

## 2024-11-12 ASSESSMENT — COLUMBIA-SUICIDE SEVERITY RATING SCALE - C-SSRS
2. HAVE YOU ACTUALLY HAD ANY THOUGHTS OF KILLING YOURSELF?: NO
6. HAVE YOU EVER DONE ANYTHING, STARTED TO DO ANYTHING, OR PREPARED TO DO ANYTHING TO END YOUR LIFE?: NO
1. IN THE PAST MONTH, HAVE YOU WISHED YOU WERE DEAD OR WISHED YOU COULD GO TO SLEEP AND NOT WAKE UP?: NO

## 2024-11-12 ASSESSMENT — PAIN SCALES - GENERAL: PAINLEVEL_OUTOF10: 6

## 2024-11-12 ASSESSMENT — PAIN DESCRIPTION - DESCRIPTORS: DESCRIPTORS: ACHING

## 2024-11-12 NOTE — ED TRIAGE NOTES
Pt. States he had let hip discomfort yesterday, today while at work he twisted and now has significant pain in his left hip that is worse with walking or standing. Denies any radiation of pain or numbness/tingling

## 2024-11-12 NOTE — ED PROVIDER NOTES
HPI   Chief Complaint   Patient presents with    Hip Pain     Pt. States he had let hip discomfort yesterday, today while at work he twisted and now has significant pain in his left hip that is worse with walking or standing.  Denies any radiation of pain or numbness/tingling         23 yo old male, history of diabetes, test with left hip pain.  States he felt slight soreness yesterday in the lateral aspect of left hip, but today when he turned while at work had sudden onset of worsening pain in the lateral aspect of the hip.  Tried Tylenol and lidocaine patch with some relief.  There is no radicular symptomatology there is no bowel or bladder issue.  There was no direct blow injury or trauma or fall to the area.  He has no skin changes over he says.  There is no fevers chills or other signs symptoms of systemic illness.  No history of similar issues.  No other joint or areas involved.                          Paxton Coma Scale Score: 15                  Patient History   Past Medical History:   Diagnosis Date    Acute pharyngitis, unspecified 03/08/2016    Acute viral pharyngitis    Acute upper respiratory infection, unspecified 04/13/2020    Viral URI with cough    Acute upper respiratory infection, unspecified 03/08/2016    Viral URI with cough    Acute upper respiratory infection, unspecified 09/11/2014    Viral URI    Contusion of left thigh, initial encounter 02/18/2016    Hematoma of left thigh    Other conditions influencing health status     No significant past medical history    Other obesity due to excess calories 04/02/2015    Exogenous obesity    Personal history of other diseases of male genital organs 05/06/2016    History of discharge from penis    Personal history of other diseases of the respiratory system 03/08/2016    History of sore throat    Personal history of other diseases of the respiratory system 09/11/2014    History of acute pharyngitis    Personal history of other diseases of the  respiratory system 10/01/2014    History of acute sinusitis    Personal history of other diseases of the respiratory system 09/11/2014    History of acute pharyngitis    Personal history of other diseases of the respiratory system 03/17/2014    History of acute bronchitis    Personal history of other infectious and parasitic diseases 04/13/2020    History of viral infection    Personal history of other specified conditions 10/01/2014    History of fever    Personal history of other specified conditions 04/02/2015    History of headache    Personal history of other specified conditions 10/01/2014    History of fatigue    Personal history of pneumonia (recurrent) 05/28/2013    History of mycoplasma pneumonia     History reviewed. No pertinent surgical history.  Family History   Problem Relation Name Age of Onset    Other (congenital  hypothyroidism) Mother      Thyroid cancer Mother      Hyperlipidemia Father      Hypertension Father      Thyroid cancer Other grandmother      Social History     Tobacco Use    Smoking status: Never    Smokeless tobacco: Never   Vaping Use    Vaping status: Never Used   Substance Use Topics    Alcohol use: Yes     Comment: once a month    Drug use: Yes     Types: Marijuana     Comment: medical marijuana       Review of Systems   Constitutional:  Negative for chills and fever.   HENT:  Negative for rhinorrhea and sore throat.    Eyes:  Negative for pain and discharge.   Respiratory:  Negative for cough and shortness of breath.    Cardiovascular:  Negative for chest pain and palpitations.   Gastrointestinal:  Negative for abdominal pain, nausea and vomiting.   Genitourinary:  Negative for dysuria and urgency.   Musculoskeletal:  Negative for back pain, gait problem and joint swelling.        Left hip issue as above.   Skin:  Negative for rash and wound.   Neurological:  Negative for dizziness and headaches.        No focal/lateralizing weakness or numbness on review    Psychiatric/Behavioral:  Negative for agitation and confusion.         Physical Exam   ED Triage Vitals   Temp Pulse Resp BP   -- -- -- --      SpO2 Temp src Heart Rate Source Patient Position   -- -- -- --      BP Location FiO2 (%)     -- --       Physical Exam  Vitals reviewed.   Constitutional:       General: He is not in acute distress.     Appearance: He is well-developed.   HENT:      Head: Normocephalic and atraumatic.      Right Ear: External ear normal.      Left Ear: External ear normal.      Nose: Nose normal.      Mouth/Throat:      Mouth: Mucous membranes are moist.      Pharynx: Oropharynx is clear.   Eyes:      Conjunctiva/sclera: Conjunctivae normal.      Pupils: Pupils are equal, round, and reactive to light.   Neck:      Vascular: No JVD.   Cardiovascular:      Rate and Rhythm: Normal rate and regular rhythm.      Pulses: Normal pulses.   Pulmonary:      Effort: Pulmonary effort is normal. No accessory muscle usage or respiratory distress.      Breath sounds: Normal breath sounds.   Abdominal:      General: Abdomen is flat. There is no distension.      Palpations: Abdomen is soft. There is no mass.      Tenderness: There is no abdominal tenderness.   Musculoskeletal:         General: Tenderness present. Normal range of motion.      Cervical back: Normal range of motion and neck supple.      Comments: Tenderness beginning over left hip, greater trochanteric region extending just down the leg laterally over the greater trochanteric bursa region.  There is no skin changes noted.  No suspicion of septic arthritis with passive arc range of motion without indication of pain.  He is able to weight-bear without difficulty.  His distal neurovascular status intact.  Is no radiculopathy or signs symptoms consistent with any cauda equina issues.  This all localized to the lateral greater trochanteric hip region.   Lymphadenopathy:      Cervical: No cervical adenopathy.   Skin:     General: Skin is warm and  dry.      Capillary Refill: Capillary refill takes less than 2 seconds.      Comments: No zoster rash seen   Neurological:      General: No focal deficit present.      Mental Status: He is alert. Mental status is at baseline.   Psychiatric:         Mood and Affect: Mood normal.                 ECG if performed, ordered and interpreted by ED physician:        Labs Reviewed - No data to display       XR hip left with pelvis when performed 2 or 3 views    (Results Pending)            ED Course & MDM                ED Course as of 11/12/24 1216   Tue Nov 12, 2024   1202 XR hip left with pelvis when performed 2 or 3 views [KS]      ED Course User Index  [KS] Sy Houston MD MPH         Diagnoses as of 11/12/24 1216   Left hip pain   Greater trochanteric bursitis of left hip       Medical Decision Making  X-ray of the left hip will be performed.  Pending negative, can likely be discharged with continuation of anti-inflammatories and lidocaine patches.    Preliminary interpretation x-rays by ED physician demonstrates 2 view hip with no acute fracture or process identified.    Reasonable for discharge with continuation of anti-inflammatories and lidocaine patch and outpatient follow-up.          Procedure  Procedures         Sy Houston MD MPH  11/12/24 1216

## 2024-11-12 NOTE — DISCHARGE INSTRUCTIONS
Try anti-inflammatory such as ibuprofen, Tylenol, or Motrin for the pain.  Continue lidocaine patches as well if needed.  Follow-up with your doctor for recheck to ensure resolution of pain.  Return if you feel worse in any other way.

## 2024-11-26 ENCOUNTER — HOSPITAL ENCOUNTER (EMERGENCY)
Facility: HOSPITAL | Age: 24
Discharge: HOME | End: 2024-11-27
Attending: EMERGENCY MEDICINE
Payer: COMMERCIAL

## 2024-11-26 DIAGNOSIS — E10.10 TYPE 1 DIABETES MELLITUS WITH KETOACIDOSIS WITHOUT COMA: ICD-10-CM

## 2024-11-26 DIAGNOSIS — R19.7 VOMITING AND DIARRHEA: Primary | ICD-10-CM

## 2024-11-26 DIAGNOSIS — R11.10 VOMITING AND DIARRHEA: Primary | ICD-10-CM

## 2024-11-26 LAB
ALBUMIN SERPL BCP-MCNC: 5.1 G/DL (ref 3.4–5)
ALP SERPL-CCNC: 70 U/L (ref 33–120)
ALT SERPL W P-5'-P-CCNC: 16 U/L (ref 10–52)
ANION GAP SERPL CALC-SCNC: 24 MMOL/L (ref 10–20)
AST SERPL W P-5'-P-CCNC: 17 U/L (ref 9–39)
BASOPHILS # BLD AUTO: 0.02 X10*3/UL (ref 0–0.1)
BASOPHILS NFR BLD AUTO: 0.2 %
BILIRUB SERPL-MCNC: 1.9 MG/DL (ref 0–1.2)
BUN SERPL-MCNC: 15 MG/DL (ref 6–23)
CALCIUM SERPL-MCNC: 10.3 MG/DL (ref 8.6–10.3)
CHLORIDE SERPL-SCNC: 99 MMOL/L (ref 98–107)
CO2 SERPL-SCNC: 18 MMOL/L (ref 21–32)
CREAT SERPL-MCNC: 1.1 MG/DL (ref 0.5–1.3)
EGFRCR SERPLBLD CKD-EPI 2021: >90 ML/MIN/1.73M*2
EOSINOPHIL # BLD AUTO: 0.05 X10*3/UL (ref 0–0.7)
EOSINOPHIL NFR BLD AUTO: 0.4 %
ERYTHROCYTE [DISTWIDTH] IN BLOOD BY AUTOMATED COUNT: 12.1 % (ref 11.5–14.5)
GLUCOSE BLD MANUAL STRIP-MCNC: 212 MG/DL (ref 74–99)
GLUCOSE SERPL-MCNC: 218 MG/DL (ref 74–99)
HCT VFR BLD AUTO: 49.2 % (ref 41–52)
HGB BLD-MCNC: 17.1 G/DL (ref 13.5–17.5)
IMM GRANULOCYTES # BLD AUTO: 0.03 X10*3/UL (ref 0–0.7)
IMM GRANULOCYTES NFR BLD AUTO: 0.3 % (ref 0–0.9)
LIPASE SERPL-CCNC: 7 U/L (ref 9–82)
LYMPHOCYTES # BLD AUTO: 0.36 X10*3/UL (ref 1.2–4.8)
LYMPHOCYTES NFR BLD AUTO: 3.1 %
MCH RBC QN AUTO: 29.9 PG (ref 26–34)
MCHC RBC AUTO-ENTMCNC: 34.8 G/DL (ref 32–36)
MCV RBC AUTO: 86 FL (ref 80–100)
MONOCYTES # BLD AUTO: 0.41 X10*3/UL (ref 0.1–1)
MONOCYTES NFR BLD AUTO: 3.5 %
NEUTROPHILS # BLD AUTO: 10.77 X10*3/UL (ref 1.2–7.7)
NEUTROPHILS NFR BLD AUTO: 92.5 %
NRBC BLD-RTO: 0 /100 WBCS (ref 0–0)
PLATELET # BLD AUTO: 250 X10*3/UL (ref 150–450)
POTASSIUM SERPL-SCNC: 4 MMOL/L (ref 3.5–5.3)
PROT SERPL-MCNC: 7.7 G/DL (ref 6.4–8.2)
RBC # BLD AUTO: 5.71 X10*6/UL (ref 4.5–5.9)
SODIUM SERPL-SCNC: 137 MMOL/L (ref 136–145)
WBC # BLD AUTO: 11.6 X10*3/UL (ref 4.4–11.3)

## 2024-11-26 PROCEDURE — 80053 COMPREHEN METABOLIC PANEL: CPT | Performed by: EMERGENCY MEDICINE

## 2024-11-26 PROCEDURE — 36415 COLL VENOUS BLD VENIPUNCTURE: CPT | Performed by: EMERGENCY MEDICINE

## 2024-11-26 PROCEDURE — 83690 ASSAY OF LIPASE: CPT | Performed by: EMERGENCY MEDICINE

## 2024-11-26 PROCEDURE — 82947 ASSAY GLUCOSE BLOOD QUANT: CPT

## 2024-11-26 PROCEDURE — 96374 THER/PROPH/DIAG INJ IV PUSH: CPT

## 2024-11-26 PROCEDURE — 85025 COMPLETE CBC W/AUTO DIFF WBC: CPT | Performed by: EMERGENCY MEDICINE

## 2024-11-26 PROCEDURE — 99285 EMERGENCY DEPT VISIT HI MDM: CPT

## 2024-11-26 PROCEDURE — 2500000004 HC RX 250 GENERAL PHARMACY W/ HCPCS (ALT 636 FOR OP/ED): Performed by: EMERGENCY MEDICINE

## 2024-11-26 RX ORDER — ONDANSETRON HYDROCHLORIDE 2 MG/ML
INJECTION, SOLUTION INTRAVENOUS
Status: COMPLETED
Start: 2024-11-26 | End: 2024-11-26

## 2024-11-26 RX ORDER — ONDANSETRON HYDROCHLORIDE 2 MG/ML
4 INJECTION, SOLUTION INTRAVENOUS ONCE
Status: COMPLETED | OUTPATIENT
Start: 2024-11-27 | End: 2024-11-26

## 2024-11-26 ASSESSMENT — COLUMBIA-SUICIDE SEVERITY RATING SCALE - C-SSRS
6. HAVE YOU EVER DONE ANYTHING, STARTED TO DO ANYTHING, OR PREPARED TO DO ANYTHING TO END YOUR LIFE?: NO
1. IN THE PAST MONTH, HAVE YOU WISHED YOU WERE DEAD OR WISHED YOU COULD GO TO SLEEP AND NOT WAKE UP?: NO
2. HAVE YOU ACTUALLY HAD ANY THOUGHTS OF KILLING YOURSELF?: NO

## 2024-11-26 ASSESSMENT — PAIN - FUNCTIONAL ASSESSMENT: PAIN_FUNCTIONAL_ASSESSMENT: 0-10

## 2024-11-26 ASSESSMENT — PAIN SCALES - GENERAL: PAINLEVEL_OUTOF10: 4

## 2024-11-26 ASSESSMENT — PAIN DESCRIPTION - LOCATION: LOCATION: ABDOMEN

## 2024-11-26 ASSESSMENT — PAIN DESCRIPTION - PAIN TYPE: TYPE: ACUTE PAIN

## 2024-11-27 ENCOUNTER — HOSPITAL ENCOUNTER (OUTPATIENT)
Dept: CARDIOLOGY | Facility: HOSPITAL | Age: 24
Discharge: HOME | End: 2024-11-27
Payer: COMMERCIAL

## 2024-11-27 ENCOUNTER — APPOINTMENT (OUTPATIENT)
Dept: RADIOLOGY | Facility: HOSPITAL | Age: 24
End: 2024-11-27
Payer: COMMERCIAL

## 2024-11-27 VITALS
HEIGHT: 73 IN | SYSTOLIC BLOOD PRESSURE: 123 MMHG | WEIGHT: 200 LBS | RESPIRATION RATE: 16 BRPM | BODY MASS INDEX: 26.51 KG/M2 | HEART RATE: 106 BPM | DIASTOLIC BLOOD PRESSURE: 63 MMHG | TEMPERATURE: 98.3 F | OXYGEN SATURATION: 95 %

## 2024-11-27 LAB
ALBUMIN SERPL BCP-MCNC: 3.9 G/DL (ref 3.4–5)
ALP SERPL-CCNC: 51 U/L (ref 33–120)
ALT SERPL W P-5'-P-CCNC: 11 U/L (ref 10–52)
ANION GAP SERPL CALC-SCNC: 18 MMOL/L (ref 10–20)
APPEARANCE UR: CLEAR
AST SERPL W P-5'-P-CCNC: 11 U/L (ref 9–39)
BILIRUB SERPL-MCNC: 1.7 MG/DL (ref 0–1.2)
BILIRUB UR STRIP.AUTO-MCNC: NEGATIVE MG/DL
BUN SERPL-MCNC: 15 MG/DL (ref 6–23)
CALCIUM SERPL-MCNC: 8.1 MG/DL (ref 8.6–10.3)
CHLORIDE SERPL-SCNC: 106 MMOL/L (ref 98–107)
CO2 SERPL-SCNC: 18 MMOL/L (ref 21–32)
COLOR UR: ABNORMAL
CREAT SERPL-MCNC: 0.89 MG/DL (ref 0.5–1.3)
EGFRCR SERPLBLD CKD-EPI 2021: >90 ML/MIN/1.73M*2
FLUAV RNA RESP QL NAA+PROBE: NOT DETECTED
FLUBV RNA RESP QL NAA+PROBE: NOT DETECTED
GLUCOSE SERPL-MCNC: 241 MG/DL (ref 74–99)
GLUCOSE UR STRIP.AUTO-MCNC: ABNORMAL MG/DL
HOLD SPECIMEN: NORMAL
KETONES UR STRIP.AUTO-MCNC: ABNORMAL MG/DL
LEUKOCYTE ESTERASE UR QL STRIP.AUTO: NEGATIVE
MUCOUS THREADS #/AREA URNS AUTO: NORMAL /LPF
NITRITE UR QL STRIP.AUTO: NEGATIVE
PH UR STRIP.AUTO: 5.5 [PH]
POTASSIUM SERPL-SCNC: 4.1 MMOL/L (ref 3.5–5.3)
PROT SERPL-MCNC: 5.8 G/DL (ref 6.4–8.2)
PROT UR STRIP.AUTO-MCNC: ABNORMAL MG/DL
RBC # UR STRIP.AUTO: NEGATIVE /UL
RBC #/AREA URNS AUTO: NORMAL /HPF
SARS-COV-2 RNA RESP QL NAA+PROBE: NOT DETECTED
SODIUM SERPL-SCNC: 138 MMOL/L (ref 136–145)
SP GR UR STRIP.AUTO: >1.05
UROBILINOGEN UR STRIP.AUTO-MCNC: NORMAL MG/DL
WBC #/AREA URNS AUTO: NORMAL /HPF

## 2024-11-27 PROCEDURE — 84075 ASSAY ALKALINE PHOSPHATASE: CPT | Performed by: EMERGENCY MEDICINE

## 2024-11-27 PROCEDURE — 74177 CT ABD & PELVIS W/CONTRAST: CPT | Performed by: RADIOLOGY

## 2024-11-27 PROCEDURE — 81003 URINALYSIS AUTO W/O SCOPE: CPT | Performed by: EMERGENCY MEDICINE

## 2024-11-27 PROCEDURE — 96361 HYDRATE IV INFUSION ADD-ON: CPT

## 2024-11-27 PROCEDURE — 2500000004 HC RX 250 GENERAL PHARMACY W/ HCPCS (ALT 636 FOR OP/ED): Performed by: EMERGENCY MEDICINE

## 2024-11-27 PROCEDURE — 96376 TX/PRO/DX INJ SAME DRUG ADON: CPT

## 2024-11-27 PROCEDURE — 96375 TX/PRO/DX INJ NEW DRUG ADDON: CPT

## 2024-11-27 PROCEDURE — 36415 COLL VENOUS BLD VENIPUNCTURE: CPT | Performed by: EMERGENCY MEDICINE

## 2024-11-27 PROCEDURE — 93005 ELECTROCARDIOGRAM TRACING: CPT

## 2024-11-27 PROCEDURE — 2550000001 HC RX 255 CONTRASTS: Performed by: EMERGENCY MEDICINE

## 2024-11-27 PROCEDURE — 87636 SARSCOV2 & INF A&B AMP PRB: CPT | Performed by: EMERGENCY MEDICINE

## 2024-11-27 PROCEDURE — 74177 CT ABD & PELVIS W/CONTRAST: CPT

## 2024-11-27 RX ORDER — KETOROLAC TROMETHAMINE 15 MG/ML
15 INJECTION, SOLUTION INTRAMUSCULAR; INTRAVENOUS ONCE
Status: COMPLETED | OUTPATIENT
Start: 2024-11-27 | End: 2024-11-27

## 2024-11-27 RX ORDER — ONDANSETRON 4 MG/1
TABLET, ORALLY DISINTEGRATING ORAL
Status: DISCONTINUED
Start: 2024-11-27 | End: 2024-11-27 | Stop reason: HOSPADM

## 2024-11-27 RX ORDER — ONDANSETRON 4 MG/1
4 TABLET, ORALLY DISINTEGRATING ORAL 4 TIMES DAILY PRN
Qty: 25 TABLET | Refills: 0 | Status: SHIPPED | OUTPATIENT
Start: 2024-11-27 | End: 2024-12-04

## 2024-11-27 RX ORDER — ONDANSETRON HYDROCHLORIDE 2 MG/ML
4 INJECTION, SOLUTION INTRAVENOUS ONCE
Status: COMPLETED | OUTPATIENT
Start: 2024-11-27 | End: 2024-11-27

## 2024-11-27 ASSESSMENT — PAIN SCALES - GENERAL: PAINLEVEL_OUTOF10: 2

## 2024-11-27 NOTE — DISCHARGE INSTRUCTIONS
May take over-the-counter Imodium as needed for diarrhea.  Drink plenty of fluids.  Drink sugar-free electrolyte replacement drinks.

## 2024-11-27 NOTE — ED TRIAGE NOTES
Pt ambulatory to ED from home c/o abdominal pain, vomiting, diarrhea starting today, hx T1DM, blood glucose 212, IV inserted and blood drawn, placed on cardiac monitor and cont pulse ox

## 2024-11-27 NOTE — ED PROVIDER NOTES
HPI   Chief Complaint   Patient presents with    Vomiting       24-year-old male with past medical history significant for type 1 diabetes presents to the emergency department complaining of abdominal pain, nausea and vomiting and diarrhea.  Patient states that he has had a slight cough for the past few days but today he suddenly started vomiting and developed diarrhea.  Then started developing the abdominal pain.  States that his insulin pump ran out and he was going to change it but because he was not eating he decided to hold.  Denies any fevers or chills.  States the pain is in the lower abdomen mostly periumbilical.  Denies any other complaints.              Patient History   Past Medical History:   Diagnosis Date    Acute pharyngitis, unspecified 03/08/2016    Acute viral pharyngitis    Acute upper respiratory infection, unspecified 04/13/2020    Viral URI with cough    Acute upper respiratory infection, unspecified 03/08/2016    Viral URI with cough    Acute upper respiratory infection, unspecified 09/11/2014    Viral URI    Contusion of left thigh, initial encounter 02/18/2016    Hematoma of left thigh    Other conditions influencing health status     No significant past medical history    Other obesity due to excess calories 04/02/2015    Exogenous obesity    Personal history of other diseases of male genital organs 05/06/2016    History of discharge from penis    Personal history of other diseases of the respiratory system 03/08/2016    History of sore throat    Personal history of other diseases of the respiratory system 09/11/2014    History of acute pharyngitis    Personal history of other diseases of the respiratory system 10/01/2014    History of acute sinusitis    Personal history of other diseases of the respiratory system 09/11/2014    History of acute pharyngitis    Personal history of other diseases of the respiratory system 03/17/2014    History of acute bronchitis    Personal history of other  infectious and parasitic diseases 04/13/2020    History of viral infection    Personal history of other specified conditions 10/01/2014    History of fever    Personal history of other specified conditions 04/02/2015    History of headache    Personal history of other specified conditions 10/01/2014    History of fatigue    Personal history of pneumonia (recurrent) 05/28/2013    History of mycoplasma pneumonia     History reviewed. No pertinent surgical history.  Family History   Problem Relation Name Age of Onset    Other (congenital  hypothyroidism) Mother      Thyroid cancer Mother      Hyperlipidemia Father      Hypertension Father      Thyroid cancer Other grandmother      Social History     Tobacco Use    Smoking status: Never    Smokeless tobacco: Never   Vaping Use    Vaping status: Never Used   Substance Use Topics    Alcohol use: Yes     Comment: once a month    Drug use: Yes     Types: Marijuana     Comment: medical marijuana       Physical Exam   ED Triage Vitals [11/26/24 2322]   Temperature Heart Rate Respirations BP   36.8 °C (98.3 °F) 91 (!) 22 126/68      Pulse Ox Temp Source Heart Rate Source Patient Position   100 % Oral -- --      BP Location FiO2 (%)     -- --       Physical Exam  HENT:      Head: Normocephalic and atraumatic.      Mouth/Throat:      Mouth: Mucous membranes are moist.   Eyes:      Extraocular Movements: Extraocular movements intact.      Pupils: Pupils are equal, round, and reactive to light.   Cardiovascular:      Rate and Rhythm: Normal rate and regular rhythm.   Pulmonary:      Effort: Pulmonary effort is normal.      Breath sounds: Normal breath sounds.   Abdominal:      Palpations: Abdomen is soft.      Tenderness: There is abdominal tenderness in the periumbilical area and left lower quadrant.   Musculoskeletal:         General: Normal range of motion.   Skin:     General: Skin is warm and dry.      Coloration: Skin is pale.   Neurological:      General: No focal deficit  present.      Mental Status: He is alert and oriented to person, place, and time.   Psychiatric:         Behavior: Behavior normal.           ED Course & MDM   ED Course as of 11/27/24 1900   Tue Nov 26, 2024   0429 EKG done at 2334 which I reviewed independently interpreted reveals a normal sinus rhythm at a rate of 92 with normal intervals, normal axis, normal QT QTc, normal EKG, no STEMI. [RM]      ED Course User Index  [RM] Trevon Holley MD         Diagnoses as of 11/27/24 1900   Vomiting and diarrhea   Type 1 diabetes mellitus with ketoacidosis without coma                 No data recorded     [place] Coma Scale Score: 15 (11/26/24 2323 : Elsa Young RN)                     11/26/2024  Labs Reviewed   CBC WITH AUTO DIFFERENTIAL - Abnormal       Result Value    WBC 11.6 (*)     nRBC 0.0      RBC 5.71      Hemoglobin 17.1      Hematocrit 49.2      MCV 86      MCH 29.9      MCHC 34.8      RDW 12.1      Platelets 250      Neutrophils % 92.5      Immature Granulocytes %, Automated 0.3      Lymphocytes % 3.1      Monocytes % 3.5      Eosinophils % 0.4      Basophils % 0.2      Neutrophils Absolute 10.77 (*)     Immature Granulocytes Absolute, Automated 0.03      Lymphocytes Absolute 0.36 (*)     Monocytes Absolute 0.41      Eosinophils Absolute 0.05      Basophils Absolute 0.02     COMPREHENSIVE METABOLIC PANEL - Abnormal    Glucose 218 (*)     Sodium 137      Potassium 4.0      Chloride 99      Bicarbonate 18 (*)     Anion Gap 24 (*)     Urea Nitrogen 15      Creatinine 1.10      eGFR >90      Calcium 10.3      Albumin 5.1 (*)     Alkaline Phosphatase 70      Total Protein 7.7      AST 17      Bilirubin, Total 1.9 (*)     ALT 16     LIPASE - Abnormal    Lipase 7 (*)     Narrative:     Venipuncture immediately after or during the administration of Metamizole may lead to falsely low results. Testing should be performed immediately prior to Metamizole dosing.   URINALYSIS WITH REFLEX CULTURE AND MICROSCOPIC -  Abnormal    Color, Urine Light-Yellow      Appearance, Urine Clear      Specific Gravity, Urine >1.050 (*)     pH, Urine 5.5      Protein, Urine 20 (TRACE)      Glucose, Urine OVER (4+) (*)     Blood, Urine NEGATIVE      Ketones, Urine OVER (4+) (*)     Bilirubin, Urine NEGATIVE      Urobilinogen, Urine Normal      Nitrite, Urine NEGATIVE      Leukocyte Esterase, Urine NEGATIVE      Narrative:     OVER is reported when the result is greater than the clinically reportable range.   COMPREHENSIVE METABOLIC PANEL - Abnormal    Glucose 241 (*)     Sodium 138      Potassium 4.1      Chloride 106      Bicarbonate 18 (*)     Anion Gap 18      Urea Nitrogen 15      Creatinine 0.89      eGFR >90      Calcium 8.1 (*)     Albumin 3.9      Alkaline Phosphatase 51      Total Protein 5.8 (*)     AST 11      Bilirubin, Total 1.7 (*)     ALT 11     POCT GLUCOSE - Abnormal    POCT Glucose 212 (*)    SARS-COV-2 PCR - Normal    Coronavirus 2019, PCR Not Detected      Narrative:     This assay has received FDA Emergency Use Authorization (EUA) and is only authorized for the duration of time that circumstances exist to justify the authorization of the emergency use of in vitro diagnostic tests for the detection of SARS-CoV-2 virus and/or diagnosis of COVID-19 infection under section 564(b)(1) of the Act, 21 U.S.C. 360bbb-3(b)(1). This assay is an in vitro diagnostic nucleic acid amplification test for the qualitative detection of SARS-CoV-2 from nasopharyngeal specimens and has been validated for use at [facility]. Negative results do not preclude COVID-19 infections and should not be used as the sole basis for diagnosis, treatment, or other management decisions.     INFLUENZA A AND B PCR - Normal    Flu A Result Not Detected      Flu B Result Not Detected      Narrative:     This assay is an in vitro diagnostic multiplex nucleic acid amplification test for the detection and discrimination of Influenza A & B from  nasopharyngeal specimens, and has been validated for use at [facility]. Negative results do not preclude Influenza A/B infections, and should not be used as the sole basis for diagnosis, treatment, or other management decisions. If Influenza A/B and RSV PCR results are negative, testing for Parainfluenza virus, Adenovirus and Metapneumovirus is routinely performed for [facility] pediatric oncology and intensive care inpatients, and is available on other patients by placing an add-on request.   URINALYSIS WITH REFLEX CULTURE AND MICROSCOPIC    Narrative:     The following orders were created for panel order Urinalysis with Reflex Culture and Microscopic.  Procedure                               Abnormality         Status                     ---------                               -----------         ------                     Urinalysis with Reflex C...[[id number]]  Abnormal            Final result               Extra Urine Gray Tube[[id number]]                            Final result                 Please view results for these tests on the individual orders.   EXTRA URINE GRAY TUBE    Extra Tube Hold for add-ons.     URINALYSIS MICROSCOPIC WITH REFLEX CULTURE    WBC, Urine NONE      RBC, Urine NONE      Mucus, Urine FEW       CT abdomen pelvis w IV contrast   Final Result   1. Nonspecific fluid noted throughout multiple mid to lower abdominal   small bowel loops and throughout the colon. Areas of mild wall   thickening within the descending and sigmoid colon. Findings likely   reflect an infectious or inflammatory enterocolitis.        MACRO:   None        Signed by: Chi Caldwell 11/27/2024 1:02 AM   Dictation workstation:   [id number]            Medical Decision Making  24-year-old who presents to the emergency department with nausea vomiting and abdominal pain.  Type I diabetic.  Symptoms started abruptly today.  Differential includes DKA, hyperglycemia, hyperosmolar state, enteritis,  intra-abdominal pathology including diverticulitis, perforation or other pathology.  CT scan of the abdomen and pelvis was done which reveals no evidence of acute intra-abdominal pathology other than fluid in multiple loops of small bowel suggestive of enteritis.  The white count is 11.6.  H&H is normal.  Blood work reveals a glucose of 218 with a bicarb of 18 and a gap of 24.  Repeat after hydration reveals a glucose of 240, bicarb of 18 and gap of 18.  Urine reveals plus for glucose and plus for ketones.  COVID, flu are negative.  No other evidence of infectious process.  Lipase is normal and liver functions are normal.  Bilirubin is a little bit elevated at 1.7.  Etiology of this is unclear.  Patient nauseous in the emergency department.  He did receive 2 doses of Zofran.  Discussed with patient admission to the hospital but patient feels that he will be able to manage at home and would rather give it a trial.  I feel that this is reasonable.  The patient will be discharged with prescription for Zofran.  He understands that he will need to return if he is not improved as he may still require admission to the hospital for further hydration and glycemic control.  I reviewed and independently interpreted all blood work and also the CT scan.  Procedure  Procedures     Trevon Holley MD  11/27/24 0014       Trevon Holley MD  11/27/24 0500       Trevon Holley MD  11/27/24 4138

## 2024-11-28 LAB
ATRIAL RATE: 92 BPM
P AXIS: 32 DEGREES
P OFFSET: 206 MS
P ONSET: 164 MS
PR INTERVAL: 112 MS
Q ONSET: 220 MS
QRS COUNT: 15 BEATS
QRS DURATION: 82 MS
QT INTERVAL: 354 MS
QTC CALCULATION(BAZETT): 437 MS
QTC FREDERICIA: 408 MS
R AXIS: 74 DEGREES
T AXIS: 57 DEGREES
T OFFSET: 397 MS
VENTRICULAR RATE: 92 BPM